# Patient Record
Sex: FEMALE | URBAN - METROPOLITAN AREA
[De-identification: names, ages, dates, MRNs, and addresses within clinical notes are randomized per-mention and may not be internally consistent; named-entity substitution may affect disease eponyms.]

---

## 2019-01-04 ENCOUNTER — APPOINTMENT (RX ONLY)
Dept: URBAN - METROPOLITAN AREA CLINIC 41 | Facility: CLINIC | Age: 69
Setting detail: DERMATOLOGY
End: 2019-01-04

## 2019-01-04 DIAGNOSIS — Z41.9 ENCOUNTER FOR PROCEDURE FOR PURPOSES OTHER THAN REMEDYING HEALTH STATE, UNSPECIFIED: ICD-10-CM

## 2019-01-04 PROCEDURE — ? BOTOX

## 2019-01-04 ASSESSMENT — LOCATION ZONE DERM: LOCATION ZONE: FACE

## 2019-01-04 ASSESSMENT — LOCATION SIMPLE DESCRIPTION DERM
LOCATION SIMPLE: RIGHT FOREHEAD
LOCATION SIMPLE: GLABELLA
LOCATION SIMPLE: LEFT FOREHEAD

## 2019-01-04 ASSESSMENT — LOCATION DETAILED DESCRIPTION DERM
LOCATION DETAILED: GLABELLA
LOCATION DETAILED: LEFT INFERIOR FOREHEAD
LOCATION DETAILED: RIGHT INFERIOR FOREHEAD

## 2019-01-04 NOTE — PROCEDURE: BOTOX
Anterior Platysmal Bands Units: 0
Dilution (U/0.1 Cc): 1
Lot #: [id number]
Expiration Date (Month Year): 11/2020
Consent: Written consent obtained. Risks include but not limited to lid/brow ptosis, bruising, swelling, diplopia temporary effect, incomplete chemical denervation
Detail Level: Zone
Forehead Units: 25

## 2025-03-06 ENCOUNTER — APPOINTMENT (OUTPATIENT)
Dept: URBAN - METROPOLITAN AREA CLINIC 41 | Facility: CLINIC | Age: 75
Setting detail: DERMATOLOGY
End: 2025-03-06

## 2025-03-06 DIAGNOSIS — Z41.9 ENCOUNTER FOR PROCEDURE FOR PURPOSES OTHER THAN REMEDYING HEALTH STATE, UNSPECIFIED: ICD-10-CM

## 2025-03-06 PROCEDURE — ? BOTOX

## 2025-03-06 PROCEDURE — ? PRESCRIPTION

## 2025-03-06 RX ORDER — TRETIONIN 0.25 MG/G
1 CREAM TOPICAL QHS
Qty: 45 | Refills: 11 | Status: ERX | COMMUNITY
Start: 2025-03-06

## 2025-03-06 RX ADMIN — TRETIONIN 1: 0.25 CREAM TOPICAL at 00:00

## 2025-03-06 NOTE — PROCEDURE: BOTOX
Show Additional Area 4: Yes
Lateral Platysmal Bands Units: 0
Show Right And Left Pupillary Line Units: No
Post-Care Instructions: see face map for injection sites.
Detail Level: Zone
Lot #: [id number]
Expiration Date (Month Year): 2027/04
Dilution (U/0.1 Cc): 1
Forehead Units: 36
Consent: We discussed the risks and benefits of botox including but not limited to bruising, muscle weakness, lid or brow ptosis, double vision, facial weakness, headaches, procedural pain, temporary benefit only. Patient understands that treatment with botox only lessens dynamic wrinkles on a temporary basis, usually for 2-3 months.  Aware variability in patient response and that no guarantee of length of benefit can be made.  All patient's questions were answered fully and to patient satisfaction. Patient should not lie down for 6 hours after injections, and should not travel by airplane nor exercise for 24-48 hours. Patient understands that the treatment is cosmetic in nature and not covered by insurance.
Incrementing Botox Units: By 0.1 Units
Additional Area 5 Location: see diagram